# Patient Record
Sex: FEMALE | Race: WHITE | Employment: FULL TIME | URBAN - METROPOLITAN AREA
[De-identification: names, ages, dates, MRNs, and addresses within clinical notes are randomized per-mention and may not be internally consistent; named-entity substitution may affect disease eponyms.]

---

## 2024-08-07 ENCOUNTER — OFFICE VISIT (OUTPATIENT)
Age: 39
End: 2024-08-07

## 2024-08-07 VITALS
WEIGHT: 181 LBS | SYSTOLIC BLOOD PRESSURE: 114 MMHG | RESPIRATION RATE: 18 BRPM | HEIGHT: 62 IN | OXYGEN SATURATION: 97 % | DIASTOLIC BLOOD PRESSURE: 66 MMHG | BODY MASS INDEX: 33.31 KG/M2 | TEMPERATURE: 98.1 F | HEART RATE: 82 BPM

## 2024-08-07 DIAGNOSIS — J02.9 SORE THROAT: Primary | ICD-10-CM

## 2024-08-07 LAB
Lab: NORMAL
QC PASS/FAIL: NORMAL
SARS-COV-2 RDRP RESP QL NAA+PROBE: NEGATIVE

## 2024-08-07 RX ORDER — SEMAGLUTIDE 0.5 MG/.5ML
0.5 INJECTION, SOLUTION SUBCUTANEOUS
COMMUNITY

## 2024-08-07 RX ORDER — OMEPRAZOLE 10 MG/1
10 CAPSULE, DELAYED RELEASE ORAL DAILY
COMMUNITY

## 2024-08-07 RX ORDER — CETIRIZINE HYDROCHLORIDE 10 MG/1
10 TABLET ORAL DAILY
COMMUNITY

## 2024-08-07 NOTE — PROGRESS NOTES
2024   Marcelo Carrillo (: 1985) is a 39 y.o. female, New patient, here for evaluation of the following chief complaint(s):  Pharyngitis (Sore throat for 5 days - thinks it may be from Wegovy (started March) starting 1.7 next week)     ASSESSMENT/PLAN:  Below is the assessment and plan developed based on review of pertinent history, physical exam, labs, studies, and medications.  1. Sore throat  -     (ID Now) POCT COVID-19 Rapid, NAAT      The patient presents with symptoms suspicious for viral pharyngitis.  Differential includes URI, acute bronchitis, rhinosinusitis, allergic rhinitis or COVID.  Do not suspect underlying cardiopulmonary process.  I considered, but think unlikely, dangerous causes of this patient's symptoms to include acute epiglottitis, bacterial croup, infectious mononucleosis, or peritonsillar abscess.  Patient is nontoxic appearing and not in need of emergent medical intervention.      Handout given with care instructions  2. OTC for symptom management. Increase fluid intake, ensure adequate nutritional intake.  3. Follow up with PCP as needed.  4. Go to ED with development of any acute symptoms.     Follow up:  No follow-ups on file.  Follow up immediately for any new, worsening or changes or if symptoms are not improving over the next 5-7 days.     SUBJECTIVE/OBJECTIVE:  9-year-old female presents with concern of sore throat for the past 5 days.  She states her symptoms progressed over the course of the day her throat feels dry and scratchy in the evenings.  She increased her dose of Wegovy about a month ago and is concerned her sore throat may be a side effect of this medication.  She denies any associated fevers cough ear pain congestion.  Has not taken anything for her symptoms.  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      Pharyngitis       Diagnoses and all orders for this visit:  Sore throat  -     (ID Now) POCT COVID-19 Rapid, NAAT      Pharyngitis (Sore throat

## 2024-08-07 NOTE — PATIENT INSTRUCTIONS
- Negative Covid   - Drink plenty of fluids to stay hydrated     - Starting taking an OTC antihistamine such as Zyrtec, Claritin, or Allerga     - Start saline rinses    - Gargle with warm salt water. This helps reduce swelling and relieve discomfort. Gargle once an hour with 1 teaspoon of salt mixed in 8 fluid ounces of warm water. Increase fluid intake. Start Saline nasal spray & sinus rinses. Take an over-the-counter pain medicine, such as acetaminophen (Tylenol), ibuprofen (Advil, Motrin),  to reduce fever and relieve body aches. Read and follow all instructions on the label.    - Wash your hands often with soap and water. It is one of the best ways to prevent the spread of infection. You can use an alcohol rub instead, but make sure that the hand rub gets everywhere on your hands.    - Follow up with PCP if worsening, fevers persist for > 5 days, severe pain with swallowing or unable to swallow (drooling), any other concerns.

## 2024-10-10 ENCOUNTER — OFFICE VISIT (OUTPATIENT)
Age: 39
End: 2024-10-10

## 2024-10-10 VITALS
WEIGHT: 177 LBS | HEART RATE: 85 BPM | SYSTOLIC BLOOD PRESSURE: 118 MMHG | DIASTOLIC BLOOD PRESSURE: 77 MMHG | BODY MASS INDEX: 32.57 KG/M2 | TEMPERATURE: 98.1 F | HEIGHT: 62 IN | RESPIRATION RATE: 18 BRPM | OXYGEN SATURATION: 97 %

## 2024-10-10 DIAGNOSIS — J06.9 UPPER RESPIRATORY TRACT INFECTION, UNSPECIFIED TYPE: ICD-10-CM

## 2024-10-10 DIAGNOSIS — R52 BODY ACHES: Primary | ICD-10-CM

## 2024-10-10 LAB
INFLUENZA A ANTIGEN, POC: NEGATIVE
INFLUENZA B ANTIGEN, POC: NEGATIVE
Lab: NORMAL
PERFORMING INSTRUMENT: NORMAL
QC PASS/FAIL: NORMAL
SARS-COV-2, POC: NORMAL

## 2024-10-10 RX ORDER — SEMAGLUTIDE 1.7 MG/.75ML
INJECTION, SOLUTION SUBCUTANEOUS
COMMUNITY
Start: 2024-08-02

## 2024-10-10 RX ORDER — MECLIZINE HCL 12.5 MG 12.5 MG/1
TABLET ORAL
COMMUNITY

## 2024-10-10 RX ORDER — FLUTICASONE PROPIONATE 50 MCG
2 SPRAY, SUSPENSION (ML) NASAL DAILY
COMMUNITY
Start: 2014-01-16

## 2024-10-10 RX ORDER — ALBUTEROL SULFATE 90 UG/1
INHALANT RESPIRATORY (INHALATION)
COMMUNITY
Start: 2014-09-03

## 2024-10-10 RX ORDER — RIMEGEPANT SULFATE 75 MG/75MG
75 TABLET, ORALLY DISINTEGRATING ORAL
COMMUNITY
Start: 2024-03-17

## 2024-10-10 RX ORDER — TRAZODONE HYDROCHLORIDE 50 MG/1
TABLET, FILM COATED ORAL
COMMUNITY
Start: 2024-07-08

## 2024-10-10 NOTE — PATIENT INSTRUCTIONS
Thank you for visiting [facility] today.    Nasal Congestion:  Flonase (over the counter) nasal spray, once a day  Saline irrigation kits help wash out sinuses 1-2 times a day  Normal saline nasal spray  Afrin nasal spray for no more than 3-5 days    Cough:  Throat lozenges, hot tea, and honey may help  Vicks VapoRub at night to help with cough and relieve muscles aches and pain  If not prescribed a cough medication, Delsym or Robitussin are options.  It is an over the counter cough medication containing dextromethorphan to help suppress cough at night  If you have high blood pressure, take Coricidin HBP (or the generic form) instead.  Follow instructions on the box.  For thick mucus, take Mucinex (with Guafenesin only) to help thin the mucus.  Follow instructions on the box.  You will need to drink plenty of water with this medication.    Sore Throat:  Lozenges, as needed. Cepacol lozenges will help numb the throat  Chloraseptic spray also helps to numb throat pain  Salt water gargles to soothe throat pain    Sinus pain/pressure:  Warm, wet towel on face to help with facial sinus pain/pressure    Headache/Pain Fever/Body Aches:  If you can take NSAIDs, take Ibuprofen 400-800mg every 8 hours as needed  If you cannot take NSAIDs, take Tylenol 325-500mg every 6 hours as needed    Miscellanous:  Zyrtec/Xyzal/Allegra/Claritin during the day or Benadryl at night may help with allergies.  These medications also come in decongestant forms and may be used if you are having nasal/sinus congestion.  Simple foods like chicken noodle soup, smoothies, hot tea with lemon and honey may also provide some relief.  Cool mist humidifier  Stay hydrated  Vitamin C 75-90 mg daily  Zinc 40 mg daily    Please follow up with your primary care provider within 2-5 days if your signs and symptoms have not resolved or worsened.  Please be mindful of features that warrant immediate attention:  new onset fever, difficulty breathing,

## 2024-10-10 NOTE — PROGRESS NOTES
10/10/2024   Marcelo Carrillo (: 1985) is a 39 y.o. female, New patient, here for evaluation of the following chief complaint(s):  Generalized Body Aches, Chills, and Covid Testing     ASSESSMENT/PLAN:  Below is the assessment and plan developed based on review of pertinent history, physical exam, labs, studies, and medications.  1. Body aches  -     POCT Influenza A/B Antigen  -     POCT COVID-19, Antigen  2. Upper respiratory tract infection, unspecified type      Patient is previously healthy and immunocompetent, presenting with symptoms suspicious for likely viral upper respiratory infection. Flu and Covid negative. Patient is nontoxic appearing and not in need of emergent medical intervention.    The patient is a good candidate for outpatient therapy based on normal PO intake, reassuring exam with clear lungs on auscultation, normal oxygen saturations and lack of respiratory distress upon discharge.      -Provided reassurance and reassessment  -Discussed over-the-counter medications for symptomatic relief  -Provided educational material and patient instructions  -Discharged patient with instructions to follow up with PCP  -Advised to go immediately to the ED for worsening or persistent symptoms      Follow up:  Return in about 5 days (around 10/15/2024).  Follow up immediately for any new, worsening or changes or if symptoms are not improving over the next 5-7 days.     SUBJECTIVE/OBJECTIVE:  39 y.o. female presents requesting a Covid and Flu test.  She reports generalized  body aches, chills, runny nose since yesterday.  Denies any fevers or sore throat.  Has been taking DayQuil to manage her symptoms.      Generalized Body Aches       Diagnoses and all orders for this visit:  Body aches  -     POCT Influenza A/B Antigen  -     POCT COVID-19, Antigen      Generalized Body Aches, Chills, and Covid Testing      No results found for any visits on 10/10/24.      XR Results (most

## 2025-02-24 ENCOUNTER — TRANSCRIBE ORDERS (OUTPATIENT)
Facility: HOSPITAL | Age: 40
End: 2025-02-24

## 2025-02-24 DIAGNOSIS — Z82.49 FAMILY HISTORY OF ISCHEMIC HEART DISEASE AND OTHER DISEASES OF THE CIRCULATORY SYSTEM: Primary | ICD-10-CM
